# Patient Record
Sex: MALE | Race: WHITE
[De-identification: names, ages, dates, MRNs, and addresses within clinical notes are randomized per-mention and may not be internally consistent; named-entity substitution may affect disease eponyms.]

---

## 2017-07-24 ENCOUNTER — HOSPITAL ENCOUNTER (OUTPATIENT)
Dept: HOSPITAL 45 - C.RDSM | Age: 22
Discharge: HOME | End: 2017-07-24
Attending: ORTHOPAEDIC SURGERY
Payer: COMMERCIAL

## 2017-07-24 DIAGNOSIS — M54.5: Primary | ICD-10-CM

## 2017-08-10 ENCOUNTER — HOSPITAL ENCOUNTER (OUTPATIENT)
Dept: HOSPITAL 45 - C.NUCL | Age: 22
Discharge: HOME | End: 2017-08-10
Attending: ORTHOPAEDIC SURGERY
Payer: COMMERCIAL

## 2017-08-10 DIAGNOSIS — M54.5: Primary | ICD-10-CM

## 2017-08-10 NOTE — DIAGNOSTIC IMAGING REPORT
THORACOLUMBAR SPINE BONE SCAN INCLUDING SPECT IMAGING



CLINICAL HISTORY: Right lower back pain.    



COMPARISON STUDY:  Lumbar spine radiographs July 24, 2017.



TECHNIQUE: 27 mCi technetium 90 9M MDP was injected IV at 8:30 AM on August 10,

2017. 3 hours following injection, imaging of the thoracic spine and lumbar

spine was performed in multiple projections as well as SPECT imaging.



FINDINGS: There are are 2 foci of slight asymmetric increased radiotracer uptake

within the right L4 and L5 pedicles. Otherwise, radiotracer uptake is normal.

Alignment of the thoracic lumbar spine appears anatomic by scintigraphy.



IMPRESSION:  Slight asymmetric radiotracer uptake within the right L4 and L5

pedicles. 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

spondylolisthesis. 









Electronically signed by:  Sahil Chawla M.D.

8/10/2017 2:41 PM



Dictated Date/Time:  8/10/2017 1:17 PM

## 2017-10-08 ENCOUNTER — HOSPITAL ENCOUNTER (OUTPATIENT)
Dept: HOSPITAL 45 - C.RDSM | Age: 22
Discharge: HOME | End: 2017-10-08
Attending: ORTHOPAEDIC SURGERY
Payer: COMMERCIAL

## 2017-10-08 DIAGNOSIS — M25.572: ICD-10-CM

## 2017-10-08 DIAGNOSIS — M25.571: Primary | ICD-10-CM

## 2017-10-08 DIAGNOSIS — M79.642: ICD-10-CM

## 2017-10-08 DIAGNOSIS — M79.641: ICD-10-CM

## 2017-10-16 ENCOUNTER — HOSPITAL ENCOUNTER (OUTPATIENT)
Dept: HOSPITAL 45 - C.RDSM | Age: 22
Discharge: HOME | End: 2017-10-16
Payer: COMMERCIAL

## 2017-10-16 DIAGNOSIS — M54.5: Primary | ICD-10-CM

## 2017-10-29 ENCOUNTER — HOSPITAL ENCOUNTER (OUTPATIENT)
Dept: HOSPITAL 45 - C.MRI | Age: 22
Discharge: HOME | End: 2017-10-29
Attending: ORTHOPAEDIC SURGERY
Payer: COMMERCIAL

## 2017-10-29 DIAGNOSIS — M46.1: Primary | ICD-10-CM

## 2017-10-29 NOTE — DIAGNOSTIC IMAGING REPORT
PELVIS WITHOUT CONTRAST (MRI)



CLINICAL HISTORY: EVAL FOR INFLAMMATION pain



TECHNIQUE: Multiaxial MRI acquisition



COMPARISON STUDY:  None



FINDINGS: Signal characteristics of the osseous structures are in general

unremarkable. There is minimal degenerative and reactive edema of the sacroiliac

joints. Major bone marrow replacing process is not appreciated.



Sacral plexus appears to be unremarkable. Lower aspects of the lumbar spine and

sacrum otherwise are unremarkable.



There is no evidence of mass or collection. Sacral foramina are symmetric. There

is no significant adenopathy. Iliopsoas structures appear symmetric.



IMPRESSION:  

1. Minimal infiltrative/edematous change of the sacroiliac joints primarily at

the lower margins.

2. Study is otherwise normal. 









The above report was generated using voice recognition software.  It may contain

grammatical, syntax or spelling errors.







Electronically signed by:  Evans Quinteros M.D.

10/29/2017 1:38 PM



Dictated Date/Time:  10/29/2017 1:34 PM

## 2018-01-17 ENCOUNTER — HOSPITAL ENCOUNTER (OUTPATIENT)
Dept: HOSPITAL 45 - C.RDSM | Age: 23
Discharge: HOME | End: 2018-01-17
Attending: ORTHOPAEDIC SURGERY
Payer: COMMERCIAL

## 2018-01-17 DIAGNOSIS — J10.1: ICD-10-CM

## 2018-01-17 DIAGNOSIS — R05: Primary | ICD-10-CM

## 2018-01-17 DIAGNOSIS — R06.00: ICD-10-CM
